# Patient Record
Sex: MALE | Race: WHITE | URBAN - METROPOLITAN AREA
[De-identification: names, ages, dates, MRNs, and addresses within clinical notes are randomized per-mention and may not be internally consistent; named-entity substitution may affect disease eponyms.]

---

## 2021-12-17 ENCOUNTER — OFFICE VISIT (OUTPATIENT)
Dept: URGENT CARE | Age: 26
End: 2021-12-17
Payer: COMMERCIAL

## 2021-12-17 VITALS
RESPIRATION RATE: 20 BRPM | OXYGEN SATURATION: 100 % | SYSTOLIC BLOOD PRESSURE: 110 MMHG | HEIGHT: 72 IN | WEIGHT: 190 LBS | HEART RATE: 83 BPM | BODY MASS INDEX: 25.73 KG/M2 | TEMPERATURE: 98.6 F | DIASTOLIC BLOOD PRESSURE: 70 MMHG

## 2021-12-17 DIAGNOSIS — R05.9 COUGH IN ADULT: Primary | ICD-10-CM

## 2021-12-17 DIAGNOSIS — R09.81 NASAL CONGESTION: ICD-10-CM

## 2021-12-17 PROCEDURE — 99202 OFFICE O/P NEW SF 15 MIN: CPT | Performed by: NURSE PRACTITIONER

## 2021-12-17 RX ORDER — BENZONATATE 100 MG/1
100 CAPSULE ORAL 2 TIMES DAILY PRN
Qty: 20 CAPSULE | Refills: 0 | Status: SHIPPED | OUTPATIENT
Start: 2021-12-17 | End: 2021-12-24

## 2021-12-17 RX ORDER — FLUTICASONE PROPIONATE 50 MCG
1 SPRAY, SUSPENSION (ML) NASAL DAILY
Qty: 1 EACH | Refills: 0 | Status: SHIPPED | OUTPATIENT
Start: 2021-12-17

## 2021-12-18 NOTE — PROGRESS NOTES
2021    Al Buregr (:  1995) is a 32 y.o. male, here for a preventive medicine evaluation. Patient Active Problem List   Diagnosis    Mononucleosis       Review of Systems    Prior to Visit Medications    Medication Sig Taking? Authorizing Provider   fluticasone (FLONASE) 50 MCG/ACT nasal spray 1 spray by Each Nostril route daily 2 sprays in each nostril on day one and then 1 spray each nostril for the next 6 days Yes JESUS Thomas CNP   benzonatate (TESSALON) 100 MG capsule Take 1 capsule by mouth 2 times daily as needed for Cough Yes JESUS Thomas - CNP        No Known Allergies    Past Medical History:   Diagnosis Date    Mononucleosis        No past surgical history on file. Social History     Socioeconomic History    Marital status: Single     Spouse name: Not on file    Number of children: Not on file    Years of education: Not on file    Highest education level: Not on file   Occupational History    Not on file   Tobacco Use    Smoking status: Never Smoker    Smokeless tobacco: Never Used   Substance and Sexual Activity    Alcohol use: No    Drug use: No    Sexual activity: Not on file   Other Topics Concern    Not on file   Social History Narrative    Not on file     Social Determinants of Health     Financial Resource Strain:     Difficulty of Paying Living Expenses: Not on file   Food Insecurity:     Worried About Running Out of Food in the Last Year: Not on file    Angel of Food in the Last Year: Not on file   Transportation Needs:     Lack of Transportation (Medical): Not on file    Lack of Transportation (Non-Medical):  Not on file   Physical Activity:     Days of Exercise per Week: Not on file    Minutes of Exercise per Session: Not on file   Stress:     Feeling of Stress : Not on file   Social Connections:     Frequency of Communication with Friends and Family: Not on file    Frequency of Social Gatherings with Friends and Family: Not capsule; Take 1 capsule by mouth 2 times daily as needed for Cough, Disp-20 capsule, R-0Normal  2. Nasal congestion  -     fluticasone (FLONASE) 50 MCG/ACT nasal spray; 1 spray by Each Nostril route daily 2 sprays in each nostril on day one and then 1 spray each nostril for the next 6 days, Disp-1 each, R-0Normal      Return if symptoms worsen or fail to improve, for FOLLOW UP WITH PCP. An electronic signature was used to authenticate this note.     --JESUS Grant - CNP on 12/17/2021 at 7:59 PM

## 2021-12-18 NOTE — PATIENT INSTRUCTIONS
Patient Education        Patient Education        Patient Education        Patient Education        Patient Education        guaifenesin  Pronunciation:  sofia whitmore sin  Brand: Altarussin, Bidex-400, Fenesin IR, Raya-Tussin Expectorant, Mucinex, Mucus Relief, Robafen, Scot-Tussin, Siltussin SA, Tussin Expectorant, Xpect  What is the most important information I should know about guaifenesin? Ask a doctor or pharmacist before using this medicine if you have health problems or use other medications, or if you are pregnant or breast-feeding. What is guaifenesin? Guaifenesin is used to reduce chest congestion caused by the common cold, flu, or chronic bronchitis. Guaifenesin helps loosen congestion in your chest and throat, making it easier to cough out through your mouth. There are many brands and forms of guaifenesin available. Not all brands are listed on this leaflet. Guaifenesin may also be used for purposes not listed in this medication guide. What should I discuss with my healthcare provider before taking guaifenesin? You should not use guaifenesin if you are allergic to it. Ask a doctor or pharmacist if it is safe for you to use this medicine if you have other medical conditions. Ask a doctor before using this medicine if you are pregnant. You should not breast-feed while using guaifenesin. How should I take guaifenesin? Use exactly as directed on the label, or as prescribed by your doctor. Cold or cough medicine is only for short-term use until your symptoms clear up. Always follow directions on the medicine label about giving cough or cold medicine to a child. Do not use the medicine only to make a child sleepy. Death can occur from the misuse of cough or cold medicines in very young children. Measure liquid medicine carefully. Use the dosing syringe provided, or use a medicine dose-measuring device (not a kitchen spoon).   To use guaifenesin granules, pour out the entire packet onto your tongue and swallow without chewing. Call your doctor if your symptoms do not improve after 7 days, or if you have a fever, rash, or headaches. This medicine can affect the results of certain medical tests. Tell any doctor who treats you that you are using guaifenesin. Store at room temperature away from moisture and heat. Do not freeze. What happens if I miss a dose? Since cough or cold medicine is used when needed, you may not be on a dosing schedule. Skip any missed dose if it's almost time for your next dose. Do not use two doses at one time. What happens if I overdose? Seek emergency medical attention or call the Poison Help line at 1-396.488.7604. What should I avoid while taking guaifenesin? Ask a doctor or pharmacist before using other cough or cold medicines that may contain similar ingredients. Avoid driving or hazardous activity until you know how this medicine will affect you. Your reactions could be impaired. What are the possible side effects of guaifenesin? Get emergency medical help if you have signs of an allergic reaction: hives; difficult breathing; swelling of your face, lips, tongue, or throat. Common side effects may include:  · nausea; or  · vomiting. This is not a complete list of side effects and others may occur. Call your doctor for medical advice about side effects. You may report side effects to FDA at 0-971-BVQ-1314. What other drugs will affect guaifenesin ? Avoid using this medicine with other drugs that cause drowsiness or slow your breathing (such as opioid medicine, a muscle relaxer, or medicine for anxiety or seizures). Ask a doctor or pharmacist before using any other medication, including prescription and over-the-counter medicines, vitamins, and herbal products. Not all possible drug interactions are listed in this medication guide. Where can I get more information? Your pharmacist can provide more information about guaifenesin.   Remember, keep this and all other medicines out of the reach of children, never share your medicines with others, and use this medication only for the indication prescribed. Every effort has been made to ensure that the information provided by [address] is accurate, up-to-date, and complete, but no guarantee is made to that effect. Drug information contained herein may be time sensitive. [facility] information has been compiled for use by healthcare practitioners and consumers in the [place] and therefore [facility] does not warrant that uses outside of the [place] are appropriate, unless specifically indicated otherwise. [facility]'s drug information does not endorse drugs, diagnose patients or recommend therapy. [facility]'s drug information is an informational resource designed to assist licensed healthcare practitioners in caring for their patients and/or to serve consumers viewing this service as a supplement to, and not a substitute for, the expertise, skill, knowledge and judgment of healthcare practitioners. The absence of a warning for a given drug or drug combination in no way should be construed to indicate that the drug or drug combination is safe, effective or appropriate for any given patient. [facility] does not assume any responsibility for any aspect of healthcare administered with the aid of information [facility] provides. The information contained herein is not intended to cover all possible uses, directions, precautions, warnings, drug interactions, allergic reactions, or adverse effects. If you have questions about the drugs you are taking, check with your doctor, nurse or pharmacist.  Copyright 1460-0447 [address]: 7.01. Revision date: 2/25/2019. Care instructions adapted under license by [facility] [place] ([facility]).  If you have questions about a medical condition or this instruction, always ask your healthcare professional. [place] [address] any warranty or liability for your use of this information. Patient Education        Cough: Care Instructions  Your Care Instructions     A cough is your body's response to something that bothers your throat or airways. Many things can cause a cough. You might cough because of a cold or the flu, bronchitis, or asthma. Smoking, postnasal drip, allergies, and stomach acid that backs up into your throat also can cause coughs. A cough is a symptom, not a disease. Most coughs stop when the cause, such as a cold, goes away. You can take a few steps at home to cough less and feel better. Follow-up care is a key part of your treatment and safety. Be sure to make and go to all appointments, and call your doctor if you are having problems. It's also a good idea to know your test results and keep a list of the medicines you take. How can you care for yourself at home? · Drink lots of water and other fluids. This helps thin the mucus and soothes a dry or sore throat. Honey or lemon juice in hot water or tea may ease a dry cough. · Take cough medicine as directed by your doctor. · Prop up your head on pillows to help you breathe and ease a dry cough. · Try cough drops to soothe a dry or sore throat. Cough drops don't stop a cough. Medicine-flavored cough drops are no better than candy-flavored drops or hard candy. · Do not smoke. Avoid secondhand smoke. If you need help quitting, talk to your doctor about stop-smoking programs and medicines. These can increase your chances of quitting for good. When should you call for help? Call 911 anytime you think you may need emergency care. For example, call if:    · You have severe trouble breathing. Call your doctor now or seek immediate medical care if:    · You cough up blood.     · You have new or worse trouble breathing.     · You have a new or higher fever.     · You have a new rash.    Watch closely for changes in your health, and be sure to contact your doctor if:    · You cough more deeply or more often,